# Patient Record
Sex: FEMALE | Race: WHITE | NOT HISPANIC OR LATINO | URBAN - METROPOLITAN AREA
[De-identification: names, ages, dates, MRNs, and addresses within clinical notes are randomized per-mention and may not be internally consistent; named-entity substitution may affect disease eponyms.]

---

## 2018-11-16 PROCEDURE — 87624 HPV HI-RISK TYP POOLED RSLT: CPT | Performed by: OBSTETRICS & GYNECOLOGY

## 2018-11-16 PROCEDURE — 88175 CYTOPATH C/V AUTO FLUID REDO: CPT | Performed by: OBSTETRICS & GYNECOLOGY

## 2021-01-12 ENCOUNTER — IMMUNIZATION (OUTPATIENT)
Dept: LAB | Age: 36
End: 2021-01-12

## 2021-01-12 DIAGNOSIS — Z23 NEED FOR VACCINATION: Primary | ICD-10-CM

## 2021-01-12 PROCEDURE — 91301 COVID-19 MODERNA VACCINE: CPT

## 2021-01-12 PROCEDURE — 0011A COVID-19 MODERNA VACCINE: CPT

## 2021-02-12 ENCOUNTER — IMMUNIZATION (OUTPATIENT)
Dept: LAB | Age: 36
End: 2021-02-12
Attending: HOSPITALIST

## 2021-02-12 DIAGNOSIS — Z23 NEED FOR VACCINATION: Primary | ICD-10-CM

## 2021-02-12 PROCEDURE — 0012A COVID-19 MODERNA VACCINE: CPT | Performed by: HOSPITALIST

## 2021-02-12 PROCEDURE — 91301 COVID-19 MODERNA VACCINE: CPT | Performed by: HOSPITALIST

## 2023-05-26 ENCOUNTER — OFFICE VISIT (OUTPATIENT)
Dept: FAMILY MEDICINE CLINIC | Facility: CLINIC | Age: 38
End: 2023-05-26
Payer: COMMERCIAL

## 2023-05-26 VITALS
HEIGHT: 62 IN | RESPIRATION RATE: 16 BRPM | DIASTOLIC BLOOD PRESSURE: 60 MMHG | HEART RATE: 88 BPM | SYSTOLIC BLOOD PRESSURE: 102 MMHG | OXYGEN SATURATION: 98 % | WEIGHT: 129 LBS | BODY MASS INDEX: 23.74 KG/M2 | TEMPERATURE: 99 F

## 2023-05-26 DIAGNOSIS — R39.9 UTI SYMPTOMS: Primary | ICD-10-CM

## 2023-05-26 LAB
APPEARANCE: NORMAL
BILIRUBIN: NEGATIVE
GLUCOSE (URINE DIPSTICK): NEGATIVE MG/DL
KETONES (URINE DIPSTICK): NEGATIVE MG/DL
LEUKOCYTES: NEGATIVE
MULTISTIX LOT#: NORMAL NUMERIC
NITRITE, URINE: NEGATIVE
OCCULT BLOOD: NEGATIVE
PH, URINE: 6.5 (ref 4.5–8)
PROTEIN (URINE DIPSTICK): NEGATIVE MG/DL
SPECIFIC GRAVITY: 1.01 (ref 1–1.03)
URINE-COLOR: YELLOW
UROBILINOGEN,SEMI-QN: 0.2 MG/DL (ref 0–1.9)

## 2023-05-26 PROCEDURE — 87086 URINE CULTURE/COLONY COUNT: CPT | Performed by: PHYSICIAN ASSISTANT

## 2023-05-26 PROCEDURE — 81003 URINALYSIS AUTO W/O SCOPE: CPT | Performed by: PHYSICIAN ASSISTANT

## 2023-05-26 PROCEDURE — 99213 OFFICE O/P EST LOW 20 MIN: CPT | Performed by: PHYSICIAN ASSISTANT

## 2023-05-26 NOTE — PATIENT INSTRUCTIONS
Urine culture sent  Increase fluids  Follow up with PCP   Azo  Further treatment pending results  If worsening symptoms seek treatment

## 2023-05-30 ENCOUNTER — OFFICE VISIT (OUTPATIENT)
Dept: INTERNAL MEDICINE CLINIC | Facility: CLINIC | Age: 38
End: 2023-05-30
Payer: COMMERCIAL

## 2023-05-30 VITALS
HEART RATE: 84 BPM | SYSTOLIC BLOOD PRESSURE: 110 MMHG | OXYGEN SATURATION: 98 % | TEMPERATURE: 97 F | HEIGHT: 63 IN | WEIGHT: 129.63 LBS | RESPIRATION RATE: 16 BRPM | BODY MASS INDEX: 22.97 KG/M2 | DIASTOLIC BLOOD PRESSURE: 76 MMHG

## 2023-05-30 DIAGNOSIS — Z13.0 SCREENING FOR BLOOD DISEASE: ICD-10-CM

## 2023-05-30 DIAGNOSIS — Z13.220 SCREENING, LIPID: ICD-10-CM

## 2023-05-30 DIAGNOSIS — N94.2 VAGINISMUS: ICD-10-CM

## 2023-05-30 DIAGNOSIS — Z13.29 SCREENING FOR THYROID DISORDER: ICD-10-CM

## 2023-05-30 DIAGNOSIS — Z13.228 SCREENING FOR METABOLIC DISORDER: ICD-10-CM

## 2023-05-30 DIAGNOSIS — R39.15 URINARY URGENCY: Primary | ICD-10-CM

## 2023-05-30 PROBLEM — Z80.0 FAMILY HISTORY OF COLON CANCER: Status: ACTIVE | Noted: 2023-05-30

## 2023-05-30 LAB
BILIRUB UR QL STRIP.AUTO: NEGATIVE
COLOR UR AUTO: YELLOW
GLUCOSE UR STRIP.AUTO-MCNC: NEGATIVE MG/DL
KETONES UR STRIP.AUTO-MCNC: NEGATIVE MG/DL
LEUKOCYTE ESTERASE UR QL STRIP.AUTO: NEGATIVE
NITRITE UR QL STRIP.AUTO: NEGATIVE
PH UR STRIP.AUTO: 9 [PH] (ref 5–8)
PROT UR STRIP.AUTO-MCNC: NEGATIVE MG/DL
RBC UR QL AUTO: NEGATIVE
SP GR UR STRIP.AUTO: 1.02 (ref 1–1.03)
UROBILINOGEN UR STRIP.AUTO-MCNC: <2 MG/DL

## 2023-05-30 PROCEDURE — 3008F BODY MASS INDEX DOCD: CPT | Performed by: INTERNAL MEDICINE

## 2023-05-30 PROCEDURE — 3074F SYST BP LT 130 MM HG: CPT | Performed by: INTERNAL MEDICINE

## 2023-05-30 PROCEDURE — 99203 OFFICE O/P NEW LOW 30 MIN: CPT | Performed by: INTERNAL MEDICINE

## 2023-05-30 PROCEDURE — 3078F DIAST BP <80 MM HG: CPT | Performed by: INTERNAL MEDICINE

## 2023-05-30 PROCEDURE — 81001 URINALYSIS AUTO W/SCOPE: CPT | Performed by: INTERNAL MEDICINE

## 2023-06-07 ENCOUNTER — LABORATORY ENCOUNTER (OUTPATIENT)
Dept: LAB | Age: 38
End: 2023-06-07
Attending: INTERNAL MEDICINE
Payer: COMMERCIAL

## 2023-06-07 DIAGNOSIS — Z13.29 SCREENING FOR THYROID DISORDER: ICD-10-CM

## 2023-06-07 DIAGNOSIS — D75.89 MACROCYTOSIS: ICD-10-CM

## 2023-06-07 DIAGNOSIS — Z13.228 SCREENING FOR METABOLIC DISORDER: ICD-10-CM

## 2023-06-07 DIAGNOSIS — Z13.0 SCREENING FOR BLOOD DISEASE: ICD-10-CM

## 2023-06-07 DIAGNOSIS — D75.89 MACROCYTOSIS: Primary | ICD-10-CM

## 2023-06-07 DIAGNOSIS — Z13.220 SCREENING, LIPID: ICD-10-CM

## 2023-06-07 LAB
ALBUMIN SERPL-MCNC: 3.7 G/DL (ref 3.4–5)
ALBUMIN/GLOB SERPL: 1.1 {RATIO} (ref 1–2)
ALP LIVER SERPL-CCNC: 45 U/L
ALT SERPL-CCNC: 17 U/L
ANION GAP SERPL CALC-SCNC: 6 MMOL/L (ref 0–18)
AST SERPL-CCNC: 15 U/L (ref 15–37)
BASOPHILS # BLD AUTO: 0.03 X10(3) UL (ref 0–0.2)
BASOPHILS NFR BLD AUTO: 0.8 %
BILIRUB SERPL-MCNC: 0.8 MG/DL (ref 0.1–2)
BUN BLD-MCNC: 11 MG/DL (ref 7–18)
CALCIUM BLD-MCNC: 9.2 MG/DL (ref 8.5–10.1)
CHLORIDE SERPL-SCNC: 107 MMOL/L (ref 98–112)
CHOLEST SERPL-MCNC: 165 MG/DL (ref ?–200)
CO2 SERPL-SCNC: 25 MMOL/L (ref 21–32)
CREAT BLD-MCNC: 0.74 MG/DL
EOSINOPHIL # BLD AUTO: 0.11 X10(3) UL (ref 0–0.7)
EOSINOPHIL NFR BLD AUTO: 2.9 %
ERYTHROCYTE [DISTWIDTH] IN BLOOD BY AUTOMATED COUNT: 11.5 %
FASTING PATIENT LIPID ANSWER: YES
FASTING STATUS PATIENT QL REPORTED: YES
GFR SERPLBLD BASED ON 1.73 SQ M-ARVRAT: 107 ML/MIN/1.73M2 (ref 60–?)
GLOBULIN PLAS-MCNC: 3.4 G/DL (ref 2.8–4.4)
GLUCOSE BLD-MCNC: 95 MG/DL (ref 70–99)
HCT VFR BLD AUTO: 39.3 %
HDLC SERPL-MCNC: 54 MG/DL (ref 40–59)
HGB BLD-MCNC: 13.3 G/DL
IMM GRANULOCYTES # BLD AUTO: 0.01 X10(3) UL (ref 0–1)
IMM GRANULOCYTES NFR BLD: 0.3 %
LDLC SERPL CALC-MCNC: 94 MG/DL (ref ?–100)
LYMPHOCYTES # BLD AUTO: 1.27 X10(3) UL (ref 1–4)
LYMPHOCYTES NFR BLD AUTO: 33.4 %
MCH RBC QN AUTO: 34.1 PG (ref 26–34)
MCHC RBC AUTO-ENTMCNC: 33.8 G/DL (ref 31–37)
MCV RBC AUTO: 100.8 FL
MONOCYTES # BLD AUTO: 0.35 X10(3) UL (ref 0.1–1)
MONOCYTES NFR BLD AUTO: 9.2 %
NEUTROPHILS # BLD AUTO: 2.03 X10 (3) UL (ref 1.5–7.7)
NEUTROPHILS # BLD AUTO: 2.03 X10(3) UL (ref 1.5–7.7)
NEUTROPHILS NFR BLD AUTO: 53.4 %
NONHDLC SERPL-MCNC: 111 MG/DL (ref ?–130)
OSMOLALITY SERPL CALC.SUM OF ELEC: 285 MOSM/KG (ref 275–295)
PLATELET # BLD AUTO: 233 10(3)UL (ref 150–450)
POTASSIUM SERPL-SCNC: 4.6 MMOL/L (ref 3.5–5.1)
PROT SERPL-MCNC: 7.1 G/DL (ref 6.4–8.2)
RBC # BLD AUTO: 3.9 X10(6)UL
SODIUM SERPL-SCNC: 138 MMOL/L (ref 136–145)
TRIGL SERPL-MCNC: 94 MG/DL (ref 30–149)
TSI SER-ACNC: 1.41 MIU/ML (ref 0.36–3.74)
VIT B12 SERPL-MCNC: 312 PG/ML (ref 193–986)
VLDLC SERPL CALC-MCNC: 15 MG/DL (ref 0–30)
WBC # BLD AUTO: 3.8 X10(3) UL (ref 4–11)

## 2023-06-07 PROCEDURE — 82607 VITAMIN B-12: CPT | Performed by: INTERNAL MEDICINE

## 2023-06-07 PROCEDURE — 80050 GENERAL HEALTH PANEL: CPT | Performed by: INTERNAL MEDICINE

## 2023-06-07 PROCEDURE — 80061 LIPID PANEL: CPT | Performed by: INTERNAL MEDICINE

## 2023-06-08 DIAGNOSIS — D75.89 MACROCYTOSIS: Primary | ICD-10-CM

## 2023-06-09 DIAGNOSIS — D72.819 LEUKOPENIA, UNSPECIFIED TYPE: Primary | ICD-10-CM

## 2023-06-09 DIAGNOSIS — D75.89 MACROCYTOSIS: ICD-10-CM

## 2023-07-07 ENCOUNTER — LAB ENCOUNTER (OUTPATIENT)
Dept: LAB | Age: 38
End: 2023-07-07
Attending: INTERNAL MEDICINE
Payer: COMMERCIAL

## 2023-07-07 DIAGNOSIS — D75.89 MACROCYTOSIS: ICD-10-CM

## 2023-07-07 DIAGNOSIS — D75.89 MACROCYTOSIS: Primary | ICD-10-CM

## 2023-07-07 DIAGNOSIS — D72.819 LEUKOPENIA, UNSPECIFIED TYPE: ICD-10-CM

## 2023-07-07 LAB
ERYTHROCYTE [DISTWIDTH] IN BLOOD BY AUTOMATED COUNT: 11.5 %
FOLATE SERPL-MCNC: 19.9 NG/ML (ref 8.7–?)
HCT VFR BLD AUTO: 41.1 %
HGB BLD-MCNC: 13.6 G/DL
MCH RBC QN AUTO: 34.4 PG (ref 26–34)
MCHC RBC AUTO-ENTMCNC: 33.1 G/DL (ref 31–37)
MCV RBC AUTO: 104.1 FL
PLATELET # BLD AUTO: 236 10(3)UL (ref 150–450)
RBC # BLD AUTO: 3.95 X10(6)UL
WBC # BLD AUTO: 5.1 X10(3) UL (ref 4–11)

## 2023-07-07 PROCEDURE — 85027 COMPLETE CBC AUTOMATED: CPT

## 2023-07-07 PROCEDURE — 82746 ASSAY OF FOLIC ACID SERUM: CPT

## 2023-08-11 ENCOUNTER — LAB ENCOUNTER (OUTPATIENT)
Dept: LAB | Age: 38
End: 2023-08-11
Attending: INTERNAL MEDICINE
Payer: COMMERCIAL

## 2023-08-11 DIAGNOSIS — D75.89 MACROCYTOSIS: ICD-10-CM

## 2023-08-11 PROCEDURE — 84630 ASSAY OF ZINC: CPT

## 2023-08-11 PROCEDURE — 82525 ASSAY OF COPPER: CPT

## 2023-08-18 LAB
COPPER: 99 UG/DL
ZINC: 80 UG/DL

## 2023-10-13 ENCOUNTER — LAB ENCOUNTER (OUTPATIENT)
Dept: LAB | Age: 38
End: 2023-10-13
Attending: INTERNAL MEDICINE
Payer: COMMERCIAL

## 2023-10-13 DIAGNOSIS — R71.8 ELEVATED MCV: ICD-10-CM

## 2023-10-13 DIAGNOSIS — R39.15 URINARY URGENCY: ICD-10-CM

## 2023-10-13 DIAGNOSIS — D75.89 MACROCYTOSIS: Primary | ICD-10-CM

## 2023-10-13 LAB
BASOPHILS # BLD AUTO: 0.04 X10(3) UL (ref 0–0.2)
BASOPHILS NFR BLD AUTO: 0.9 %
EOSINOPHIL # BLD AUTO: 0.11 X10(3) UL (ref 0–0.7)
EOSINOPHIL NFR BLD AUTO: 2.6 %
ERYTHROCYTE [DISTWIDTH] IN BLOOD BY AUTOMATED COUNT: 11 %
HCT VFR BLD AUTO: 41.3 %
HGB BLD-MCNC: 13.8 G/DL
IMM GRANULOCYTES # BLD AUTO: 0.01 X10(3) UL (ref 0–1)
IMM GRANULOCYTES NFR BLD: 0.2 %
LYMPHOCYTES # BLD AUTO: 1.47 X10(3) UL (ref 1–4)
LYMPHOCYTES NFR BLD AUTO: 34.3 %
MCH RBC QN AUTO: 34.4 PG (ref 26–34)
MCHC RBC AUTO-ENTMCNC: 33.4 G/DL (ref 31–37)
MCV RBC AUTO: 103 FL
MONOCYTES # BLD AUTO: 0.38 X10(3) UL (ref 0.1–1)
MONOCYTES NFR BLD AUTO: 8.9 %
NEUTROPHILS # BLD AUTO: 2.28 X10 (3) UL (ref 1.5–7.7)
NEUTROPHILS # BLD AUTO: 2.28 X10(3) UL (ref 1.5–7.7)
NEUTROPHILS NFR BLD AUTO: 53.1 %
PLATELET # BLD AUTO: 228 10(3)UL (ref 150–450)
RBC # BLD AUTO: 4.01 X10(6)UL
WBC # BLD AUTO: 4.3 X10(3) UL (ref 4–11)

## 2023-10-13 PROCEDURE — 85025 COMPLETE CBC W/AUTO DIFF WBC: CPT

## 2023-10-31 ENCOUNTER — OFFICE VISIT (OUTPATIENT)
Dept: HEMATOLOGY/ONCOLOGY | Facility: HOSPITAL | Age: 38
End: 2023-10-31
Attending: INTERNAL MEDICINE

## 2023-10-31 VITALS
HEART RATE: 93 BPM | TEMPERATURE: 97 F | SYSTOLIC BLOOD PRESSURE: 118 MMHG | DIASTOLIC BLOOD PRESSURE: 69 MMHG | OXYGEN SATURATION: 99 % | WEIGHT: 135.19 LBS | RESPIRATION RATE: 16 BRPM | BODY MASS INDEX: 24 KG/M2

## 2023-10-31 DIAGNOSIS — D75.89 MACROCYTOSIS: Primary | ICD-10-CM

## 2023-10-31 DIAGNOSIS — Z80.0 FAMILY HISTORY OF COLON CANCER: ICD-10-CM

## 2023-10-31 PROCEDURE — 99204 OFFICE O/P NEW MOD 45 MIN: CPT | Performed by: INTERNAL MEDICINE

## 2023-10-31 NOTE — PROGRESS NOTES
Here for new consult- macrocytosis. She reports weight gain and abdominal bloating since march. She reports more weight gain today. She has had abnormal bowel habits where she will have a bowel movement Friday, sat, and sun, and then not go for another week. She is currently doing pelvic floor therapy with PT. The abdominal bloating is a little bit less than it was. She reports chronic joint pains.

## 2023-11-15 ENCOUNTER — TELEPHONE (OUTPATIENT)
Dept: INTERNAL MEDICINE CLINIC | Facility: CLINIC | Age: 38
End: 2023-11-15

## 2023-11-15 NOTE — TELEPHONE ENCOUNTER
Informed must fast no call back required. Orders to [facility]  Future Appointments   Date Time Provider Department Center   12/6/2023  2:00 PM Estevan Weinstein,  [facility]

## 2023-11-24 ENCOUNTER — OFFICE VISIT (OUTPATIENT)
Dept: FAMILY MEDICINE CLINIC | Facility: CLINIC | Age: 38
End: 2023-11-24
Payer: COMMERCIAL

## 2023-11-24 VITALS
RESPIRATION RATE: 18 BRPM | HEIGHT: 62 IN | WEIGHT: 130 LBS | TEMPERATURE: 98 F | OXYGEN SATURATION: 96 % | SYSTOLIC BLOOD PRESSURE: 110 MMHG | HEART RATE: 89 BPM | DIASTOLIC BLOOD PRESSURE: 76 MMHG | BODY MASS INDEX: 23.92 KG/M2

## 2023-11-24 DIAGNOSIS — J01.00 ACUTE NON-RECURRENT MAXILLARY SINUSITIS: Primary | ICD-10-CM

## 2023-11-24 PROCEDURE — 3074F SYST BP LT 130 MM HG: CPT

## 2023-11-24 PROCEDURE — 3008F BODY MASS INDEX DOCD: CPT

## 2023-11-24 PROCEDURE — 3078F DIAST BP <80 MM HG: CPT

## 2023-11-24 PROCEDURE — 99213 OFFICE O/P EST LOW 20 MIN: CPT

## 2023-11-24 RX ORDER — DOXYCYCLINE HYCLATE 100 MG/1
100 CAPSULE ORAL 2 TIMES DAILY
Qty: 14 CAPSULE | Refills: 0 | Status: SHIPPED | OUTPATIENT
Start: 2023-11-24 | End: 2023-12-01

## 2023-12-06 ENCOUNTER — OFFICE VISIT (OUTPATIENT)
Dept: INTERNAL MEDICINE CLINIC | Facility: CLINIC | Age: 38
End: 2023-12-06
Payer: COMMERCIAL

## 2023-12-06 VITALS
HEART RATE: 74 BPM | HEIGHT: 62 IN | BODY MASS INDEX: 24.63 KG/M2 | OXYGEN SATURATION: 98 % | WEIGHT: 133.81 LBS | SYSTOLIC BLOOD PRESSURE: 118 MMHG | DIASTOLIC BLOOD PRESSURE: 64 MMHG | RESPIRATION RATE: 18 BRPM

## 2023-12-06 DIAGNOSIS — N93.9 VAGINAL SPOTTING: ICD-10-CM

## 2023-12-06 DIAGNOSIS — Z97.5 IUD (INTRAUTERINE DEVICE) IN PLACE: ICD-10-CM

## 2023-12-06 DIAGNOSIS — R53.83 FATIGUE, UNSPECIFIED TYPE: Primary | ICD-10-CM

## 2023-12-06 DIAGNOSIS — N94.2 VAGINISMUS: ICD-10-CM

## 2023-12-06 DIAGNOSIS — R14.0 ABDOMINAL BLOATING: ICD-10-CM

## 2023-12-06 DIAGNOSIS — Z23 NEEDS FLU SHOT: ICD-10-CM

## 2023-12-06 PROCEDURE — 3008F BODY MASS INDEX DOCD: CPT | Performed by: INTERNAL MEDICINE

## 2023-12-06 PROCEDURE — 3078F DIAST BP <80 MM HG: CPT | Performed by: INTERNAL MEDICINE

## 2023-12-06 PROCEDURE — 3074F SYST BP LT 130 MM HG: CPT | Performed by: INTERNAL MEDICINE

## 2023-12-06 PROCEDURE — 90686 IIV4 VACC NO PRSV 0.5 ML IM: CPT | Performed by: INTERNAL MEDICINE

## 2023-12-06 PROCEDURE — 90471 IMMUNIZATION ADMIN: CPT | Performed by: INTERNAL MEDICINE

## 2023-12-06 PROCEDURE — 99214 OFFICE O/P EST MOD 30 MIN: CPT | Performed by: INTERNAL MEDICINE

## 2023-12-06 RX ORDER — DICYCLOMINE HCL 20 MG
20 TABLET ORAL
Qty: 120 TABLET | Refills: 0 | Status: SHIPPED | OUTPATIENT
Start: 2023-12-06 | End: 2024-01-05

## 2024-01-26 ENCOUNTER — LAB ENCOUNTER (OUTPATIENT)
Dept: LAB | Age: 39
End: 2024-01-26
Attending: INTERNAL MEDICINE
Payer: COMMERCIAL

## 2024-01-26 DIAGNOSIS — E55.9 VITAMIN D DEFICIENCY: Primary | ICD-10-CM

## 2024-01-26 DIAGNOSIS — N93.9 VAGINAL SPOTTING: ICD-10-CM

## 2024-01-26 DIAGNOSIS — R53.83 FATIGUE, UNSPECIFIED TYPE: ICD-10-CM

## 2024-01-26 LAB
FSH SERPL-ACNC: 5.4 MIU/ML
LH SERPL-ACNC: 7 MIU/ML
PROLACTIN SERPL-MCNC: 14.8 NG/ML
TSI SER-ACNC: 1.11 MIU/ML (ref 0.36–3.74)
VIT D+METAB SERPL-MCNC: 14.7 NG/ML (ref 30–100)

## 2024-01-26 PROCEDURE — 82671 ASSAY OF ESTROGENS: CPT

## 2024-01-26 PROCEDURE — 84146 ASSAY OF PROLACTIN: CPT

## 2024-01-26 PROCEDURE — 83002 ASSAY OF GONADOTROPIN (LH): CPT

## 2024-01-26 PROCEDURE — 84443 ASSAY THYROID STIM HORMONE: CPT

## 2024-01-26 PROCEDURE — 82306 VITAMIN D 25 HYDROXY: CPT

## 2024-01-26 PROCEDURE — 83001 ASSAY OF GONADOTROPIN (FSH): CPT

## 2024-01-26 RX ORDER — ERGOCALCIFEROL 1.25 MG/1
50000 CAPSULE ORAL WEEKLY
Qty: 12 CAPSULE | Refills: 0 | Status: SHIPPED | OUTPATIENT
Start: 2024-01-26 | End: 2024-04-13

## 2024-01-30 LAB
ESTRADIOL: 63.3 PG/ML
ESTRONE: 88 PG/ML

## 2024-04-02 ENCOUNTER — TELEPHONE (OUTPATIENT)
Dept: INTERNAL MEDICINE CLINIC | Facility: CLINIC | Age: 39
End: 2024-04-02

## 2024-04-02 ENCOUNTER — OFFICE VISIT (OUTPATIENT)
Dept: INTERNAL MEDICINE CLINIC | Facility: CLINIC | Age: 39
End: 2024-04-02
Payer: COMMERCIAL

## 2024-04-02 VITALS
WEIGHT: 133 LBS | RESPIRATION RATE: 12 BRPM | HEIGHT: 62 IN | OXYGEN SATURATION: 98 % | BODY MASS INDEX: 24.48 KG/M2 | DIASTOLIC BLOOD PRESSURE: 62 MMHG | HEART RATE: 67 BPM | SYSTOLIC BLOOD PRESSURE: 102 MMHG

## 2024-04-02 DIAGNOSIS — R58 ECCHYMOSIS: ICD-10-CM

## 2024-04-02 DIAGNOSIS — M62.81 MUSCLE WEAKNESS: ICD-10-CM

## 2024-04-02 DIAGNOSIS — G58.8 OTHER MONONEUROPATHY: ICD-10-CM

## 2024-04-02 DIAGNOSIS — M25.50 ARTHRALGIA, UNSPECIFIED JOINT: Primary | ICD-10-CM

## 2024-04-02 PROCEDURE — 99214 OFFICE O/P EST MOD 30 MIN: CPT | Performed by: INTERNAL MEDICINE

## 2024-04-02 PROCEDURE — 3008F BODY MASS INDEX DOCD: CPT | Performed by: INTERNAL MEDICINE

## 2024-04-02 PROCEDURE — 3078F DIAST BP <80 MM HG: CPT | Performed by: INTERNAL MEDICINE

## 2024-04-02 PROCEDURE — 3074F SYST BP LT 130 MM HG: CPT | Performed by: INTERNAL MEDICINE

## 2024-04-02 NOTE — PROGRESS NOTES
Kaelyn Ayon  1/1/1985    Chief Complaint   Patient presents with    ER F/U     Room # 15 KB     SUBJECTIVE   Kaelyn Ayon is a 39 year old female who presents for ED follow up.    She was seen at [facility] ED on 3/24 for ecchymosis and numbness in left 4th digit. She was diagnosed w/ vasculitis. Achenbach Syndrome was also considered. CBC, PT/INR unremarkable save for known macrocytosis.    This has never happened to the patient before and has no recurred.    She has always had cold hands, no discrete color change when exposed to cold.    Since 31 she developed pain and weakness particularly in upper extremities. 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    Described as her body feeling heavier than normal and taking more effort to move extremities. Since seeing chiropractor regularly these symptoms have improved.    Review of Systems   Review of Systems   No f/c/chest pain or sob. No cough. No ha or dizziness. No numbness, tingling, or weakness.     OBJECTIVE:   /62   Pulse 67   Resp 12   Ht 5' 2\" (1.575 m)   Wt 133 lb (60.3 kg)   LMP  (LMP Unknown)   SpO2 98%   BMI 24.33 kg/m²   Physical Exam   Constitutional: Oriented to person, place, and time. No distress.   Pulmonary/Chest: Effort normal. No respiratory distress.  Musculoskeletal: No synovitis in joints of hands. Strength 5/5 in BUE. Full ROM in bilateral knees.  Neurological: No focal neurological deficits. Reflexes are brisk.  Skin: Skin is warm and dry. No rash on visualized portions of skin.  Psychiatric: Normal mood and affect.     Lab Results   Component Value Date    GLU 95 06/07/2023    BUN 11 06/07/2023    CREATSERUM 0.74 06/07/2023    ANIONGAP 6 06/07/2023    CA 9.2 06/07/2023     06/07/2023    K 4.6 06/07/2023     06/07/2023    CO2 25.0 06/07/2023    OSMOCALC 285 06/07/2023      Lab Results   Component Value Date    WBC 4.3 10/13/2023    RBC 4.01 10/13/2023    HGB 13.8 10/13/2023    HCT 41.3  10/13/2023    .0 (H) 10/13/2023    MCH 34.4 (H) 10/13/2023    MCHC 33.4 10/13/2023    RDW 11.0 10/13/2023    .0 10/13/2023      Lab Results   Component Value Date    TSH 1.110 01/26/2024        ASSESSMENT AND PLAN:       ICD-10-CM    1. Arthralgia, unspecified joint  M25.50 Rheumatoid Arthritis Factor     Cyclic Citrullinate Peptide (CCP) antibodies     Sed Rate, Westergren (Automated)     C-Reactive Protein     Katy, Direct, Reflex To 9 Enas ([facility])     CELIAC DISEASE SCREEN Reflex [E]     Connective Tissue Disease (KATY) Screen [E]      2. Muscle weakness  M62.81 Sed Rate, Westergren (Automated)     C-Reactive Protein     CK (Creatine Kinase) (Not Creatinine) (E)     Connective Tissue Disease (KATY) Screen [E]      3. Other mononeuropathy  G58.8 Rheumatoid Arthritis Factor     Cyclic Citrullinate Peptide (CCP) antibodies     Sed Rate, Westergren (Automated)     C-Reactive Protein     Katy, Direct, Reflex To 9 Enas ([facility])     Connective Tissue Disease (KATY) Screen [E]     ANCA Panel Vasculitis      4. Ecchymosis  R58 Of left 4th digit. Non-traumatic. Achenbach's Syndrome is definitely possible versus other vascular phenomena. Checking labs as above.      Patient has had chronic weakness and joint pain that started at age 31. Will check extensive labs as above. Poss history of Raynaud.       The patient indicates understanding of these issues and agrees to the plan.  The patient is asked to return or present to the emergency room for worsening of symptoms.    TODAY'S ORDERS     No orders of the defined types were placed in this encounter.      Meds & Refills:  Requested Prescriptions      No prescriptions requested or ordered in this encounter       Imaging & Consults:  None    No follow-ups on file.  There are no Patient Instructions on file for this visit.    All questions were answered and the patient agrees with the plan.     Thank you,  Estevan Weinstein, DO  A total of 35 minutes  was spent for this encounter.

## 2024-04-02 NOTE — TELEPHONE ENCOUNTER
Records Requested from:    [facility] (ER visit)     Fax:[phone]    Confirmation was received.    Sent to scanning.

## 2024-04-05 ENCOUNTER — LAB ENCOUNTER (OUTPATIENT)
Dept: LAB | Age: 39
End: 2024-04-05
Attending: INTERNAL MEDICINE
Payer: COMMERCIAL

## 2024-04-05 DIAGNOSIS — M62.81 MUSCLE WEAKNESS: ICD-10-CM

## 2024-04-05 DIAGNOSIS — G58.8 OTHER MONONEUROPATHY: ICD-10-CM

## 2024-04-05 DIAGNOSIS — M25.50 ARTHRALGIA, UNSPECIFIED JOINT: ICD-10-CM

## 2024-04-05 LAB
CK SERPL-CCNC: 63 U/L
CRP SERPL-MCNC: <0.29 MG/DL (ref ?–0.3)
ERYTHROCYTE [SEDIMENTATION RATE] IN BLOOD: 14 MM/HR
IGA SERPL-MCNC: 145.5 MG/DL (ref 70–312)
RHEUMATOID FACT SERPL-ACNC: <10 IU/ML (ref ?–15)

## 2024-04-05 PROCEDURE — 86200 CCP ANTIBODY: CPT

## 2024-04-05 PROCEDURE — 86037 ANCA TITER EACH ANTIBODY: CPT

## 2024-04-05 PROCEDURE — 86364 TISS TRNSGLTMNASE EA IG CLAS: CPT

## 2024-04-05 PROCEDURE — 82550 ASSAY OF CK (CPK): CPT

## 2024-04-05 PROCEDURE — 86431 RHEUMATOID FACTOR QUANT: CPT

## 2024-04-05 PROCEDURE — 86038 ANTINUCLEAR ANTIBODIES: CPT

## 2024-04-05 PROCEDURE — 83516 IMMUNOASSAY NONANTIBODY: CPT

## 2024-04-05 PROCEDURE — 86225 DNA ANTIBODY NATIVE: CPT

## 2024-04-05 PROCEDURE — 82784 ASSAY IGA/IGD/IGG/IGM EACH: CPT

## 2024-04-05 PROCEDURE — 86140 C-REACTIVE PROTEIN: CPT

## 2024-04-05 PROCEDURE — 85652 RBC SED RATE AUTOMATED: CPT

## 2024-04-08 LAB
CCP IGG SERPL-ACNC: 1.4 U/ML (ref 0–6.9)
DSDNA IGG SERPL IA-ACNC: 1.5 IU/ML
ENA AB SER QL IA: 0.2 UG/L
ENA AB SER QL IA: NEGATIVE
TTG IGA SER-ACNC: 0.5 U/ML (ref ?–7)

## 2024-04-09 LAB
ANTI-MPO ANTIBODIES: <0.2 UNITS
ANTI-PR3 ANTIBODIES: <0.2 UNITS

## 2024-04-11 DIAGNOSIS — R58 ECCHYMOSIS: ICD-10-CM

## 2024-04-11 DIAGNOSIS — M25.50 ARTHRALGIA, UNSPECIFIED JOINT: Primary | ICD-10-CM

## 2024-07-09 ENCOUNTER — PATIENT MESSAGE (OUTPATIENT)
Dept: INTERNAL MEDICINE CLINIC | Facility: CLINIC | Age: 39
End: 2024-07-09

## 2024-07-09 DIAGNOSIS — K57.92 DIVERTICULITIS: Primary | ICD-10-CM

## 2024-07-09 DIAGNOSIS — N20.0 KIDNEY STONE: ICD-10-CM

## 2024-07-12 NOTE — TELEPHONE ENCOUNTER
From: Kaelyn Ayon  To: Estevan Weinstein  Sent: 7/9/2024 12:35 PM CDT  Subject: Referral Requests    Hi Dr. Weinstein,   I was seen at a [facility] yesterday, and I was diagnosed with Kidney Stones and Diverticulosis. It's been recommended I see a Urologist and Gastroenterologist for follow-up. They automatically sent me referrals for their providers. However, I wanted to see if I could possibly schedule with [facility] providers. Are you able to send me referrals for both Urology and Gastroenterology? Thanks!    -Kaelyn

## 2024-07-12 NOTE — TELEPHONE ENCOUNTER
Please advise on provider recommendations PPO insurance so referrals/orders are recommendations for care.

## 2024-07-12 NOTE — TELEPHONE ENCOUNTER
Referred to Dinora Stokes for Urology. Can see any provider at [facility]. As long as she had diverticulosis not diverticulitis and is asymptomatic does not necessary have to see GI urgently.

## 2024-08-19 ENCOUNTER — OFFICE VISIT (OUTPATIENT)
Dept: RHEUMATOLOGY | Facility: CLINIC | Age: 39
End: 2024-08-19
Payer: COMMERCIAL

## 2024-08-19 VITALS
HEART RATE: 86 BPM | BODY MASS INDEX: 24.48 KG/M2 | WEIGHT: 133 LBS | HEIGHT: 62 IN | DIASTOLIC BLOOD PRESSURE: 72 MMHG | OXYGEN SATURATION: 98 % | TEMPERATURE: 98 F | SYSTOLIC BLOOD PRESSURE: 116 MMHG

## 2024-08-19 DIAGNOSIS — M79.7 FIBROMYALGIA: Primary | ICD-10-CM

## 2024-08-19 DIAGNOSIS — F51.01 PRIMARY INSOMNIA: ICD-10-CM

## 2024-08-19 DIAGNOSIS — F41.9 ANXIETY: ICD-10-CM

## 2024-08-19 PROCEDURE — 99205 OFFICE O/P NEW HI 60 MIN: CPT | Performed by: INTERNAL MEDICINE

## 2024-08-19 PROCEDURE — 3008F BODY MASS INDEX DOCD: CPT | Performed by: INTERNAL MEDICINE

## 2024-08-19 PROCEDURE — 3078F DIAST BP <80 MM HG: CPT | Performed by: INTERNAL MEDICINE

## 2024-08-19 PROCEDURE — 3074F SYST BP LT 130 MM HG: CPT | Performed by: INTERNAL MEDICINE

## 2024-08-19 RX ORDER — DICYCLOMINE HCL 20 MG
20 TABLET ORAL
COMMUNITY

## 2024-08-19 NOTE — PROGRESS NOTES
[facility], [address] [place] [address]      Consult     Kaelyn Ayon Patient Status:  No patient class for patient encounter    1985 MRN [id number]   Location [facility], [address] [place] [address] Attending No att. providers found   Hosp Day # 0 PCP Estevan Weinstein DO     Referring Provider: PCP    Reason for Consultation: Chronic pain syndrome; rule out connective tissue disease      Subjective:    Kaelyn Ayon is a 39 year old female with comes in for further evaluation of joint pain 8 years she has had chronically since he originally had her (IUD) copper one (10 years) she changed to a new one 3 years ago    Has pain in her shoulders, elbows , wrists or hands; left knee pain; low back; hip pain (COVID)  (mild to mod) LOC (left knee)     Sees a chiropractor for last 2-3 years (manipulation) stretching (benefits once a month, back to 2 weeks) 2024 (kidney stones) first time diagnosis (saw a urologist, passed) left kidney (second) appt.     Has also has neck pain (combination of burning; numbness,shooting; achiness)     Denies any history of DVT PE TIA CVA seizures     +migraines +headaches (infrequent) tylneol ibuprofen) 4 weeks ago (5 years ago last one)    States no shortness of breath ,chest pain ,fevers ,chills    States no urinary or bowel symptoms; bloody stools    States no headaches +jaw pain (mouth guard) clenching: , vision changes    States no history of pericardial, pleural effusions    States no significant dry eyes or dry mouth (mild dry)     States no history of uveitis iritis scleritis    States no history of Raynaud's or digital ulcerations (just whitish) 10-20 minutes; tingling    States no major weight changes; night sweats    Wt Readings from Last 6 Encounters:   24 133 lb (60.3 kg)   24 133 lb (60.3 kg)   23 133 lb 12.8 oz (60.7 kg)   23 130 lb (59 kg)   10/31/23 135 lb 3.2 oz (61.3 kg)   23 129 lb  9.6 oz (58.8 kg)     Her weight has been stable    States no history of photosensitive or malar rash. (Beer flushed)     States no history of psoriasis    Denies any depression +anxiety + insomnia (fatigue she has multiple cats has nonrestorative sleep    States childhood of mother having mental health issues as the trauma but no physical trauma; she was doing therapy since she was 23 years old    History/Other:      Past Medical History:  Past Medical History:    Calculus of kidney    Diverticulosis        Past Surgical History:   Past Surgical History:   Procedure Laterality Date    Appendectomy      Colonoscopy      july 2021    Other surgical history      sinus surgery     Other surgical history      oral surgery     Tonsillectomy         Social History:  reports that she has never smoked. She has never used smokeless tobacco. She reports current alcohol use. She reports that she does not use drugs.    Family History:   Family History   Problem Relation Age of Onset    Colon Cancer Father     Heart Disease Mother     Hypertension Mother     Cancer Mother         pre cancer for melanona on vulvar    Thyroid Disorder Mother     Mental Disorder Mother     Dementia Mother         Alzheimers     Dementia Maternal Grandmother         Alzheimers    Mental Disorder Maternal Grandmother     Cancer Maternal Grandfather         Lung       Allergies:   Allergies   Allergen Reactions    Amoxicillin NAUSEA AND VOMITING    Metronidazole HALLUCINATION and ITCHING       Current Medications:  Current Outpatient Medications   Medication Sig Dispense Refill    dicyclomine 20 MG Oral Tab Take 1 tablet (20 mg total) by mouth.      levonorgestrel (KYLEENA) 19.5 MG Intrauterine IUD 19,500 mcg (1 each total) by Intrauterine route one time.      busPIRone HCl 10 MG Oral Tab Take 1 tablet (10 mg total) by mouth in the morning and 1 tablet (10 mg total) before bedtime.      Atomoxetine HCl 60 MG Oral Cap TK 1 C PO EVERY MORNING        No  current facility-administered medications for this visit.     (Not in a hospital admission)    Wt Readings from Last 6 Encounters:   08/19/24 133 lb (60.3 kg)   04/02/24 133 lb (60.3 kg)   12/06/23 133 lb 12.8 oz (60.7 kg)   11/24/23 130 lb (59 kg)   10/31/23 135 lb 3.2 oz (61.3 kg)   05/30/23 129 lb 9.6 oz (58.8 kg)         Review of Systems:     Constitutional: Negative for chills, , fatigue, fever and unexpected weight change.    HENT: Negative for congestion, and mouth sores.    Eyes: Negative for photophobia, pain, redness and visual disturbance.    Respiratory: Negative for apnea, cough, chest tightness, shortness of breath, wheezing and stridor.    Cardiovascular: Negative for chest pain, palpitations and leg swelling.    Gastrointestinal: Negative for abdominal distention, abdominal pain, blood in stool, constipation, diarrhea and nausea.    Endocrine: Negative.     Genitourinary: Negative for decreased urine volume, difficulty urinating, dyspareunia, dysuria, flank pain, and frequency.    Musculoskeletal: Negative for arthralgias, gait problem and joint swelling.    Skin: Negative for color change, pallor and rash. No raynauds or digital ulcerations no sclerodactly.    Allergic/Immunologic: Negative.    Neurological: Negative for dizziness, tremors, seizures, syncope, speech difficulty, weakness, light-headedness, numbness and headaches.    Hematological: Does not bruise/bleed easily.    Psychiatric/Behavioral: Negative for confusion, decreased concentration, hallucinations, self-injury, sleep disturbance and suicidal ideas or depression.    Objective:   Vitals:    08/19/24 1100   BP: 116/72   Pulse: 86   Temp: 98.2 °F (36.8 °C)          Constitutional: is oriented to person, place, and time. Appears well-developed and well-nourished. No distress.    HEENT: Normocephalic; EOMI; no jvd; no LAD; no oral or nasal ulcers.     Eyes: Conjunctivae and EOM are normal. Pupils are equal, round, and reactive to  light.     Neck: Normal range of motion. No thyromegaly present.    Cardiovascular: RRR, no murmurs.    Lungs: Clear, Bilateral air entry, no wheezes.    Abdominal: Soft.    Musculoskeletal:         Joint Exam 08/19/2024        Right  Left   Sternoclavicular   Tender   Tender   Acromioclavicular   Tender   Tender   Cervical Spine   Tender      Thoracic Spine   Tender      Lumbar Spine   Tender      Sacroiliac   Tender   Tender   Hip   Tender   Tender        Swollen: 0      Tender: 11          Right shoulder: Exhibits normal range of motion on abduction and internal rotation, no tenderness, no bony tenderness, no deformity, no laceration, no pain and no spasm.        Left shoulder: Exhibits normal range of motion on abduction and internal and external rotation.  no tenderness, no bony tenderness, no swelling, no effusion, no deformity, no pain, no spasm and normal strength.        Right elbow:  Exhibits normal range of motion, no swelling, no effusion and no deformity. No tenderness found. No medial epicondyle, no lateral epicondyle and no olecranon process tenderness noted. There are no contractures or tophi or nodules.        Left elbow:  Normal range of motion, no swelling, no effusion and no deformity. No medial epicondyle, no lateral epicondyle and no olecranon process tenderness noted. There are no contractures or tophi or nodules.        Right wrist:  Exhibits normal range of motion, no tenderness, no bony tenderness, no swelling, no effusion and no crepitus. Flexion and extension intact w/o limitation.        Left wrist: Exhibits normal range of motion, no tenderness, no bony tenderness, no swelling, no effusion, no crepitus and no deformity. Flexion and extension intact without limitation.        Right hip: Exhibits normal range of motion, normal strength, no tenderness, no bony tenderness, no swelling and no crepitus.        Right hand: No synovitis of MCP,PIP or DIP joints; no Bouchards or Heberden nodules  noted;  strength: 100%.        Left hand: No synovitis of MCP,PIP or DIP joints; no Bouchards or Heberden nodules noted;  strength: 100%.        Left hip: Exhibits normal range of motion, normal strength, no tenderness, no bony tenderness, No swelling and no crepitus.        Right knee: Exhibits normal range of motion, no swelling, no effusion, no ecchymosis, no deformity and no erythema. No tenderness found. No medial joint line, no lateral joint line, no MCL and no LCL tenderness noted. No crepitation on flexion of knee and extension normal.        Left knee:  Exhibits normal range of motion, no swelling, no effusion, no ecchymosis and no erythema. No tenderness found. No medial joint line, no lateral joint line and no patellar tendon tenderness noted. No crepitation on flexion of the knee. Extension intact and normal.        Right ankle: No swelling, no deformity. No tenderness. Dorsiflexion and plantar flexion intact without limitation in range of motion.        Left ankle: Exhibits no swelling. No tenderness. No lateral malleolus and no medial malleolus tenderness found. Achilles tendon normal. Achilles tendon exhibits no pain, no defect and normal Weaver's test results.  Dorsiflexion and plantar flexion intact without limitation in range of motion.        Cervical back: Exhibits normal range of motion, no tenderness, no bony tenderness, no swelling, no pain and + spasm.        Thoracic back: Exhibits normal range of motion, no tenderness, no bony tenderness and no spasm.        Lumbar back:  Exhibits normal range of motion, no tenderness, no bony tenderness, no pain and no spasm.        Right foot: normal. There is normal range of motion, no tenderness, no bony tenderness, no crepitus and no laceration. There is no synovitis or tenderness of the MTP joints to palpation.        Left foot: normal. There is normal range of motion, no tenderness, no bony tenderness and no crepitus. There is no synovitis  or tenderness of the MTP joints to palpation.    Lymphadenopathy: No submental, no submandibular, and no occipital adenopathy present, has no cervical adenopathy or axillary lympadenopathy.    Neurological: Alert and oriented. No focal motor or sensory abnormalities. Strength is 5/5 Upper Extremities/Lower Extremities proximally and distally.    Skin: Skin is warm, dry and intact.  No rashes no purpuric petechial lesion    Psychiatric: Normal behavior.    Results:    Labs:      Lab Results   Component Value Date    WBC 4.3 10/13/2023    RBC 4.01 10/13/2023    HGB 13.8 10/13/2023    HCT 41.3 10/13/2023    .0 (H) 10/13/2023    MCH 34.4 (H) 10/13/2023    MCHC 33.4 10/13/2023    RDW 11.0 10/13/2023    .0 10/13/2023       No components found for: \"RELY\", \"NMET\", \"MYEL\", \"PROMY\", \"BARBRA\", \"ABSNEUTS\", \"ABSBANDS\", \"ABMM\", \"ABMY\", \"ABPM\", \"ABBL\"      Lab Results   Component Value Date     06/07/2023    K 4.6 06/07/2023    CO2 25.0 06/07/2023    BUN 11 06/07/2023    ALB 3.7 06/07/2023    AST 15 06/07/2023    ALT 17 06/07/2023          No components found for: \"ESRWESTERGRN\"       Lab Results   Component Value Date    CRP <0.29 04/05/2024         Lab Results   Component Value Date    CLARITY Hazy (A) 05/30/2023    UROBILINOGEN <2.0 05/30/2023     @LABRCNTIP(RF,B12)@      [unfilled]    Imaging:  No results found.    Assessment & Plan:      39-year-old woman comes in for further evaluation for pain fatigue    Fibromyalgia with multiple tender points nonrestorative sleep fatigue element of anxiety possible depression    Patient has no obvious synovitis on exam  Suspicion is low for connective tissue disease  Will complete autoimmune workup  Discussed with patient importance of addressing her anxiety stressors insomnia to help her overall pain levels  Continue follow-up with PCP consider psychiatry referral encouraged exercise such as tej chi yoga aquatic therapy yoga to be incorporated her daily regimen  I am  more than willing to get baseline x-rays of her cervical thoracic lumbar spine and pelvic x-rays but suspicion for significant arthritis is low encouraged exercise and stretching    She is on multiple psychotropic agents unfortunately I would not be able to add anything she will need close follow-up with PCP or psychiatrist to add on other additional treatment options including gabapentin or switching to Cymbalta considering agents such as Lyrica amitriptyline or nortriptyline could be other options through specialist or PCP    Strongly advise getting a sleep study through primary care doctor to assess insomnia nonrestorative sleep and fatigue    Unless her autoimmune workup completed is concerning we will see her back on appearing basis    Education and counseling provided to patient.  Instructed patient to call my office or seek medical attention immediately if symptoms worsen. Risks and side effects of medications and diagnosis discussed in detail and patient was given written information on new prescribed medications.    Return to clinic:  Return if symptoms worsen or fail to improve.    Liliam De Oliveira MD  8/19/2024

## 2024-08-19 NOTE — PATIENT INSTRUCTIONS
Daily reading is advised, 20 minutes per day.  \"12 Rules for Life: An Antidote to Chaos\" by Bernabe Mcclain  \"[facility] Guide to Stress-Free Living\" by Angel Garvin MD   \"Hardwiring Happiness: The New Brain Science of Contentment, Calm, and Confidence\" by Octaviano Galvan  \"Get Out of Your Mind and Into Your Life: The New Acceptance and Commitment Therapy\" by Zeus Mario   \"Full Catastrophe Living\" and \"Wherever You Go, There You Are\" by Hossein Perez   \"The Mindfulness Solution to Pain: Step-by-Step Techniques for Chronic Pain Management\", by Sarika Ricks   \"Is It Wright Dying For?: How To Make Stress Work For You - Not Against You\" by Dr. Clive Lino  \"Mind over Mood\" by Tamar.  \"[facility] on Chronic Pain\"  \"Radical Acceptance: Embracing Your Life With the Heart of a Buddha \" by Brigida Short    \"Man's Search for Meaning\", by Alden Perales  \"Left to Tell: Discovering God Amidst the [place] Holocaust\" by Liza Robison   \"Waking the Tiger : Healing Trauma : The Innate Capacity to Transform Overwhelming Experiences\", by Lewis Basurto     \"The Emperor's Handbook: A New Translation of The Meditations\" by Noe Alcantara   \"How to be a Stoic\" in [facility], by Rubina Kang (http://www.[employer].com/magazine/2016/12/19/bpi-tm-wz-a-stoic)  How to Be a Stoic by Vish Alcantara & Stoic Philosophy https://www.youtube.com/watch?v=d74tpx-XKKs  (http://opinionator.blogs.[employer]/2015/02/02/pgz-ol-ut-a-stoic/?emc=eta1)    \"How to Fail at Almost Everything and Still Win Big: Kind of the Story of My Life\" by Sushant Mendoza  \"Antifragile: Things That Gain from Disorder\" by Peter Hebert  \"Tools of Titans: The Tactics, Routines, and Habits of Billionaires, Icons, and World-Class Performers\", by Robert Patel   Paced breathing, such as in meditation, has been found to be helpful by some patients. Several mediation apps are available on Google Play and the  [employer]. She should perform this 10- 15 minutes twice daily for a couple of months and then assess its utility. Utilizing calming visualizations, such as nature scenes, can enhance the effect of the paced-breathing exercises (YouTube search: meditation and nature). Mind/body therapies, such as yoga, tej chi, exercise, and biofeedback, can also decrease the physical effects of stress.     These instructions by Dr. Angel Garvin offer breathing exercises that may be useful for training:  Calm and Energize: A Meditation With Your Breath (https://www.BugHerdube.com/watch?v=WOXUzN0ROp2)  Rhythm: A Meditation With a Word (https://www.BugHerdube.com/watch?v=FnlI-e4uxHw)  These instructions by Dr. Andrew Weil offer breathing exercises that may be useful for training: \"Three Breathing Exercises\" http://www.drweil.com/drw/u/REL92416/three-breathing-exercises.html.   Meditative movement therapies employ these breathing techniques and can help decrease the physical effects of stress including yoga, tej chi, exercise, and biofeedback.   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 The Association for Behavioral and Cognitive Therapies (ABCT) Find A Therapist Service: http://www.findcbt.org/xFAT/    Personality traits developed when younger are how one minoo with present life. A history of traumatic life events often form the basis for central  sensitization. As adults, such individuals often show tendencies for people-pleasing, perfectionism, and a strong sense of responsibility. Although this can make one an ideal worker, it comes at the cost of being physically exhausting and serves as a source of great frustration when symptoms prevent expected performance.     Often the manifestations of central sensitization coexist with mood disorders such as depression and anxiety. When these conditions are present, they can amply some of the debilitating symptoms and complicate recovery. Therefore, we strongly encourage our patients to work with their local physicians when mood symptoms are present. Psychiatry referrals can be facilitated by local physicians if necessary.     SLEEP  She has been diagnosed with possible narcolepsy, will meet with Sleep medicine to discuss diagnosis and treatment.    PHYSICAL AND OCCUPATIONAL THERAPY  Many patients will benefit from a brief period of targeted physical therapy for 8-12 weeks to improve musculoskeletal symptoms. Additionally, occupational therapy can be helpful for relaxation, stress management, life skills, and energy conservation. Referrals can be facilitated locally by the primary care provider.    EXERCISE  Above all else, she needs to begin cardio and weight training. An exercise program either independently or supervised by a  or physical therapist should focus on proximal lower extremity resistance strength training as well as aerobic fitness.   Some patients can benefit initially by using local [facility] for supervised slow incremental reconditioning.    Cardio should be done for at least 30 minutes every day.   The target heart rate should be at least 130 and perhaps close to 160 bpm, followed by a long cool down to allow heart rate recovery before stopping.   Recumbent exercises are often better tolerated, including recumbent biking, rowing, or swimming (e.g., walking or  jogging in water).   Examples of cardio exercise include brisk walking, jogging, elliptical machine, stair stepper, upright stationary bicycle, biking outdoors, and group fitness classes at a local gym such as [employer], cycling class, or [employer].     1) Postural training: It is important to remain upright as much as possible during the day. Patients with orthostatic intolerance can improve their tolerance of orthostatic stress by increasing each day the time spent upright. Prolonged bedrest is best avoided, as this can lead to orthostatic deconditioning.    2) Strength training: Exercises that strengthen the thigh muscles, such as cycling, can be helpful. Weight training should target the proximal lower limbs and core with such exercises as leg presses, toe presses, leg extensions, leg curls. Yoga and Pilates can also be helpful.  Daily large muscle exercises facilitate venous return.  These include bicep curls with 3-5 pound weights, squats, and heel raises working up to 25 repetitions of each exercise.   These can be done as part of the morning getting-out-of-bed routine, improving symptoms for the next several hours.     MEDICATIONS  There is no evidence that pure opioids, such as morphine or oxycodone have any benefit in fibromyalgia. In addition, opioid-induced hyperalgesia is a serious concern, in which pain is worsened by opioid use. Although Tramadol has moderate evidence for efficacy, adverse events, drug cross-reactions, and opioid-induced hyperalgesia argue against its consistent use.     Modest improvement of fibromyalgia has been observed with drugs targeting a diverse range of molecular mechanisms. However, no single drug has offered substantial efficacy. The heterogeneity of this disorder and widely varied responses to medications suggests existence of patient subgroups. As a result, therapies for fibromyalgia need to recognize the impact of central and peripheral aspects of the pathophysiology  utilizing both medications and nonpharmacologic approaches.    From a medication perspective, FDA-approved medications for fibromyalgia include duloxetine, milnacipran, and pregabalin. Additional options (non-FDA approved) include gabapentin and the older tricyclic agents (amitriptyline, nortriptyline). Ideally it is best to start with a single medication and slowly titrate upwards to a therapeutic dose, attempting to minimize adverse effects. A combined medication approach can also be undertaken. The advantage of this approach is the use of two different medications with different mechanisms at lower doses to minimize side effects with potential additive benefit from an overall symptom standpoint.    A reasonable approach to medication initiation and titration is listed below. The rate of titration should be based on medication tolerability, presence of adverse effects, and the patient's sensitivity towards medications. Following medication initiation, a patient must be reevaluated by their local provider for efficacy and side effects.     FDA approved:  Duloxetine (SNRI): Best for pain + mood symptoms.  · Start at 20 mg daily, increase over several weeks-to-months to 60 mg   · Maximum dose: 60 mg daily    Milnacipran (SNRI): Best for pain + mood symptoms.  · Start at 6.25-12.5 mg daily, increase over several weeks-to-months 25-50 mg   · Maximum dose: 100 mg twice daily    Pregabalin (Alpha-2-delta calcium channel ligand): Best for pain + sleep + paresthesia symptoms.   · Start at 25 mg daily, increase over several weeks-to-months to 50-75 mg   · Maximum dose: 225 mg twice daily    Non-FDA approved:  Gabapentin: Best for pain + sleep + paresthesia symptoms.  · Start at 100-300 mg nightly, increase over several weeks-to-months to 600 mg   · If efficacious, a morning or afternoon dose can be started with gradual uptitration  · Maximum dose: 2400 mg total per day    Amitriptyline or Nortriptyline: Best for pain +  sleep + paresthesia symptoms.  · Start at 10 mg nightly, and increase over several weeks-to-months to 50 mg  · Maximum dose: 75 mg daily    COMBINED MEDICATION APPROACH  A combination of low dose SNRI (Duloxetine 30 mg daily, or Milnacipran 12.5-25 mg daily) along with a low dose of pregabalin or gabapentin (pregabalin 50-75 mg or gabapentin 300 mg at night) could be considered. The advantage of this approach is use of two different medications with different mechanisms at lower doses to minimize side effects with potential additive benefit from an overall symptom standpoint.     DIET  Low inflammation diets can be helpful, such as the Mediterranean diet.  There is some recent evidence that probiotics alters the intestinal microbiome, which has considerable effects on the enteric nervous system connections with the central nervous system, including direct effects on intestinal function, mood and anxiety.  Probiotics, especially when coupled with prebiotic, can also be helpful.   Fermented milk products such as Activia yogurt or Ayaka's Kefir are useful examples. Notably, increased fiber, a prebiotic, can also be helpful at reducing inflammation, according to studies on osteoarthritis.    FATIGUE  Oncologists have been recommending ginseng for cancer patients to treat their chronic fatigue,   The mechanism for cancer-related fatigue appears to be the same as the proposed origin of chronic fatigue.  \"The mechanism by which American ginseng may be able to moderate fatigue is evidenced by preclinical data. Several investigators have established a consistent link between CRF and inflammation and have provided data to support dysregulation of the hypothalamic pituitary adrenal axis. These data suggest that chronic fatigue in cancer is associated with an inability for the hypothalamic pituitary adrenal axis to regulate inflammatory processes and that concentrations of inflammatory cytokines remain elevated instead of  reachieving homeostasis. Preclinical data evaluating the biologic activity of ginseng have demonstrated the ability of ginseng to downregulate inflammatory pathways, decrease inflammation, and modulate cortisol and the impact of chronic stress on the hypothalamic pituitary adrenal axis.\"  DOSE:  1,000 mg taken 2 times per day, at breakfast and lunch (before noon to ensure it doesn't negatively impact sleep although this has not been reported).  Vitamin C 1 g daily may help with reduction of tumor necrosis factor (TNF), an inflammatory cytokine known to be active in chronic fatigue symptoms.  Vitamin B6 is known to reduce inflammatory cytokines in inflammatory disorder such as rheumatoid arthritis. Recommended daily allowance (RDA) for oral intake is:  19 to 50 years: 1.3 mg per day.  >51 years: Females: 1.5 mg, Males: 1.7 mg per day.  Vitamin B6 deficiency can be treated with 100 mg daily for 4 weeks.  Excessive intake can cause neurologic affects such as neuropathy symptoms.

## 2024-08-22 ENCOUNTER — HOSPITAL ENCOUNTER (OUTPATIENT)
Dept: GENERAL RADIOLOGY | Age: 39
Discharge: HOME | End: 2024-08-22
Attending: INTERNAL MEDICINE
Payer: COMMERCIAL

## 2024-08-22 DIAGNOSIS — M79.7 FIBROMYALGIA: ICD-10-CM

## 2024-08-22 PROCEDURE — 72072 X-RAY EXAM THORAC SPINE 3VWS: CPT | Performed by: INTERNAL MEDICINE

## 2024-08-22 PROCEDURE — 72190 X-RAY EXAM OF PELVIS: CPT | Performed by: INTERNAL MEDICINE

## 2024-08-22 PROCEDURE — 72040 X-RAY EXAM NECK SPINE 2-3 VW: CPT | Performed by: INTERNAL MEDICINE

## 2024-08-22 PROCEDURE — 72110 X-RAY EXAM L-2 SPINE 4/>VWS: CPT | Performed by: INTERNAL MEDICINE

## 2024-08-23 ENCOUNTER — LAB ENCOUNTER (OUTPATIENT)
Dept: LAB | Age: 39
End: 2024-08-23
Attending: INTERNAL MEDICINE
Payer: COMMERCIAL

## 2024-08-23 DIAGNOSIS — E55.9 VITAMIN D DEFICIENCY: ICD-10-CM

## 2024-08-23 DIAGNOSIS — M79.7 FIBROMYALGIA: ICD-10-CM

## 2024-08-23 LAB
C3 SERPL-MCNC: 91.4 MG/DL (ref 84–160)
C4 SERPL-MCNC: 28.3 MG/DL (ref 12–36)
IGA SERPL-MCNC: 152.2 MG/DL (ref 40–350)
IGM SERPL-MCNC: 122 MG/DL (ref 50–300)
IMMUNOGLOBULIN PNL SER-MCNC: 1075 MG/DL (ref 650–1600)
URATE SERPL-MCNC: 4.2 MG/DL
VIT D+METAB SERPL-MCNC: 14.3 NG/ML (ref 30–100)

## 2024-08-23 PROCEDURE — 84165 PROTEIN E-PHORESIS SERUM: CPT

## 2024-08-23 PROCEDURE — 82306 VITAMIN D 25 HYDROXY: CPT

## 2024-08-23 PROCEDURE — 86235 NUCLEAR ANTIGEN ANTIBODY: CPT

## 2024-08-23 PROCEDURE — 84550 ASSAY OF BLOOD/URIC ACID: CPT

## 2024-08-23 PROCEDURE — 83521 IG LIGHT CHAINS FREE EACH: CPT

## 2024-08-23 PROCEDURE — 86800 THYROGLOBULIN ANTIBODY: CPT

## 2024-08-23 PROCEDURE — 82784 ASSAY IGA/IGD/IGG/IGM EACH: CPT

## 2024-08-23 PROCEDURE — 86160 COMPLEMENT ANTIGEN: CPT

## 2024-08-23 PROCEDURE — 86334 IMMUNOFIX E-PHORESIS SERUM: CPT

## 2024-08-23 PROCEDURE — 86376 MICROSOMAL ANTIBODY EACH: CPT

## 2024-08-23 PROCEDURE — 87522 HEPATITIS C REVRS TRNSCRPJ: CPT

## 2024-08-23 RX ORDER — ERGOCALCIFEROL 1.25 MG/1
50000 CAPSULE, LIQUID FILLED ORAL WEEKLY
Qty: 12 CAPSULE | Refills: 0 | Status: SHIPPED | OUTPATIENT
Start: 2024-08-23 | End: 2024-11-21

## 2024-08-24 LAB
THYROGLOB SERPL-MCNC: <15 U/ML (ref ?–60)
THYROPEROXIDASE AB SERPL-ACNC: 41 U/ML (ref ?–60)

## 2024-08-26 LAB
ENA RNP IGG SER IA-ACNC: 0.6 U/ML
ENA SM IGG SER IA-ACNC: <0.7 U/ML
ENA SS-A IGG SER IA-ACNC: <0.4 U/ML
ENA SS-B IGG SER IA-ACNC: <0.4 U/ML
KAPPA LC FREE SER-MCNC: 1.39 MG/DL (ref 0.33–1.94)
KAPPA LC FREE/LAMBDA FREE SER NEPH: 1.47 {RATIO} (ref 0.26–1.65)
LAMBDA LC FREE SERPL-MCNC: 0.95 MG/DL (ref 0.57–2.63)
U1 SNRNP IGG SER IA-ACNC: 0.7 U/ML

## 2024-08-27 ENCOUNTER — TELEPHONE (OUTPATIENT)
Dept: RHEUMATOLOGY | Facility: CLINIC | Age: 39
End: 2024-08-27

## 2024-08-27 DIAGNOSIS — M62.830 LUMBAR PARASPINAL MUSCLE SPASM: Primary | ICD-10-CM

## 2024-08-27 DIAGNOSIS — M62.838 CERVICAL PARASPINAL MUSCLE SPASM: ICD-10-CM

## 2024-08-27 DIAGNOSIS — M15.0 PRIMARY OSTEOARTHRITIS INVOLVING MULTIPLE JOINTS: ICD-10-CM

## 2024-08-27 LAB
ALBUMIN SERPL ELPH-MCNC: 4.46 G/DL (ref 3.75–5.21)
ALBUMIN/GLOB SERPL: 1.75 {RATIO} (ref 1–2)
ALPHA1 GLOB SERPL ELPH-MCNC: 0.26 G/DL (ref 0.19–0.46)
ALPHA2 GLOB SERPL ELPH-MCNC: 0.6 G/DL (ref 0.48–1.05)
B-GLOBULIN SERPL ELPH-MCNC: 0.64 G/DL (ref 0.68–1.23)
GAMMA GLOB SERPL ELPH-MCNC: 1.04 G/DL (ref 0.62–1.7)
PROT SERPL-MCNC: 7 G/DL (ref 5.7–8.2)

## 2024-08-27 NOTE — TELEPHONE ENCOUNTER
FINDINGS:  Punctate probable left renal stone.  There is lumbar lordosis.  There are mild endplate degenerative changes at the lumbar spine.  There is mild facet arthropathy at L4-5 and L5-S1.  No acute displaced osseous fracture is seen.  There is   lumbar lordosis.     X-rays of the lumbar spine shows mild osteoarthritis    Cervical spine x-rays show muscle spasm and minimal arthritis    Thoracic spine x-rays also show mild arthritis    Pelvic x-ray is normal    As discussed in clinic would suggest exercise strengthening consider physical therapy or pain management referral patient would like to go further steps they will decide whether MRI and other intervention is needed

## 2024-08-28 NOTE — TELEPHONE ENCOUNTER
Phoned pt, notified of test results per Dr. De Oliveira     \"X-rays of the lumbar spine shows mild osteoarthritis     Cervical spine x-rays show muscle spasm and minimal arthritis     Thoracic spine x-rays also show mild arthritis     Pelvic x-ray is normal     As discussed in clinic would suggest exercise strengthening consider physical therapy or pain management referral patient would like to go further steps they will decide whether MRI and other intervention is needed\"    Pt voices understanding, and would like referral placed for PT Edward.

## 2024-09-22 ENCOUNTER — HOSPITAL ENCOUNTER (OUTPATIENT)
Age: 39
Discharge: HOME | End: 2024-09-22
Payer: COMMERCIAL

## 2024-09-22 VITALS
HEART RATE: 77 BPM | RESPIRATION RATE: 20 BRPM | HEIGHT: 62.5 IN | TEMPERATURE: 98 F | BODY MASS INDEX: 22.61 KG/M2 | SYSTOLIC BLOOD PRESSURE: 128 MMHG | OXYGEN SATURATION: 99 % | DIASTOLIC BLOOD PRESSURE: 70 MMHG | WEIGHT: 126 LBS

## 2024-09-22 DIAGNOSIS — H11.31 SUBCONJUNCTIVAL HEMORRHAGE OF RIGHT EYE: Primary | ICD-10-CM

## 2024-09-22 PROBLEM — M79.81 ACHENBACH'S SYNDROME: Status: ACTIVE | Noted: 2024-03-24

## 2024-09-22 PROBLEM — F90.9 ATTENTION DEFICIT HYPERACTIVITY DISORDER (ADHD): Status: ACTIVE | Noted: 2017-07-25

## 2024-09-22 PROCEDURE — 99202 OFFICE O/P NEW SF 15 MIN: CPT

## 2024-09-22 PROCEDURE — 99212 OFFICE O/P EST SF 10 MIN: CPT

## 2024-09-22 NOTE — ED INITIAL ASSESSMENT (HPI)
Patient presents to IC with c/o right eye redness since Thursday. Denies drainage.  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

## 2024-09-22 NOTE — DISCHARGE INSTRUCTIONS
See attached information     - Avoid touching / rubbing eye area   - You may benefit from lubricating drops (e.g. Refresh)   - Drink plenty of water   - Follow up with primary care provider as needed

## 2024-09-22 NOTE — ED PROVIDER NOTES
History     Chief Complaint   Patient presents with    Eye Visual Problem       Subjective:   HPI    Kaelynrona Ayon, 39 year old female with notable medical history of n/a who presents with reddened eye. Patient reports noticing redness to her medial Right eye 3-days ago with subjective mild improvement yesterday, but today appears worse. Denies pain, discharge, FB sensation, URI symptoms. PATIENT does look at a screen a lot. Patient does reports recent eye exam.        Objective:   Past Medical History:    Calculus of kidney    Diverticulosis              Past Surgical History:   Procedure Laterality Date    Appendectomy      Colonoscopy      july 2021    Other surgical history      sinus surgery     Other surgical history      oral surgery     Tonsillectomy                  Social History     Socioeconomic History    Marital status:    Tobacco Use    Smoking status: Never    Smokeless tobacco: Never   Vaping Use    Vaping status: Never Used   Substance and Sexual Activity    Alcohol use: Yes    Drug use: No     Social Determinants of Health      [facility]              No current facility-administered medications on file prior to encounter.     Current Outpatient Medications on File Prior to Encounter   Medication Sig Dispense Refill    ergocalciferol 1.25 MG (14341 UT) Oral Cap Take 1 capsule (50,000 Units total) by mouth once a week. 12 capsule 0    dicyclomine 20 MG Oral Tab Take 1 tablet (20 mg total) by mouth.      levonorgestrel (KYLEENA) 19.5 MG Intrauterine IUD 19,500 mcg (1 each total) by Intrauterine route one time.      busPIRone HCl 10 MG Oral Tab Take 1 tablet (10 mg total) by mouth in the morning and 1 tablet (10 mg total) before bedtime.      Atomoxetine HCl 60 MG Oral Cap TK 1 C PO EVERY MORNING           Review of Systems   Eyes:  Positive for redness. Negative for pain and discharge.   All other systems reviewed and are  negative.        Constitutional and vital signs reviewed.      All other systems reviewed and negative except as noted above.    I have reviewed the family history, social history, allergies, and outpatient medications.     History reviewed from EMR: Encounters, problem list, allergies, medications      Physical Exam     ED Triage Vitals [09/22/24 1220]   /70   Pulse 77   Resp 20   Temp 97.9 °F (36.6 °C)   Temp src Temporal   SpO2 99 %   O2 Device None (Room air)       Current:/70   Pulse 77   Temp 97.9 °F (36.6 °C) (Temporal)   Resp 20   Ht 158.8 cm (5' 2.5\")   Wt 57.2 kg   LMP  (LMP Unknown)   SpO2 99%   BMI 22.68 kg/m²       Physical Exam  Vitals and nursing note reviewed.   Constitutional:       General: She is not in acute distress.     Appearance: Normal appearance. She is normal weight. She is not ill-appearing or toxic-appearing.   HENT:      Head: Normocephalic and atraumatic.      Right Ear: External ear normal.      Left Ear: External ear normal.      Nose: Nose normal.      Mouth/Throat:      Mouth: Mucous membranes are moist.   Eyes:      Extraocular Movements: Extraocular movements intact.      Conjunctiva/sclera:      Right eye: Hemorrhage present.      Pupils: Pupils are equal, round, and reactive to light.        Comments: +hemorrhage to Right medial conjunctiva.   Cardiovascular:      Rate and Rhythm: Normal rate.      Pulses: Normal pulses.   Pulmonary:      Effort: Pulmonary effort is normal. No respiratory distress.   Musculoskeletal:         General: No swelling, tenderness or signs of injury. Normal range of motion.      Cervical back: Normal range of motion and neck supple.   Skin:     General: Skin is warm and dry.      Capillary Refill: Capillary refill takes less than 2 seconds.      Coloration: Skin is not jaundiced.   Neurological:      General: No focal deficit present.      Mental Status: She is alert and oriented to person, place, and time. Mental status is at  baseline.   Psychiatric:         Mood and Affect: Mood normal.         Behavior: Behavior normal.         Thought Content: Thought content normal.         Judgment: Judgment normal.            ED Course     Labs Reviewed - No data to display  No orders to display       Vitals:    09/22/24 1220   BP: 128/70   Pulse: 77   Resp: 20   Temp: 97.9 °F (36.6 °C)   TempSrc: Temporal   SpO2: 99%   Weight: 57.2 kg   Height: 158.8 cm (5' 2.5\")            [facility]        Kaelyn Ayon, 39 year old female with medical history as noted above who presents with reddened eye   - Patient in NAD, VSS   - conjunctivitis vs subconjunctival hemorrhage vs allergies vs other   - Exam c/w subconjunctival hemorrhage   - Reassurance provided   - f/u with primary care provider as needed        ** See ED course below for additional information on care provided / interventions / notable events throughout patient's encounter.         ** I have independently reviewed the radiology images, clinical lab results, and ECG tracings as described above (if applicable)    ** Concerning co-morbidities possibly affecting complaint / care: n/a    ** See below for home care instructions (if applicable)          Medical Decision Making  Risk  OTC drugs.        Disposition and Plan     Clinical Impression:  1. Subconjunctival hemorrhage of right eye         Disposition:  Discharge  9/22/2024 12:32 pm    Follow-up:  Estevan Weinstein DO  [address]  [address] [address]  [phone]      As needed          Medications Prescribed:  Current Discharge Medication List          The above patient (and/or guardian) was made aware that an appropriate evaluation has been performed, and that no additional testing is required at this time. In my medical judgment, there is currently no evidence of an immediate life-threatening or surgical condition, therefore discharge is indicated at this time. The patient (and/or guardian) was advised that a small risk still  exists that a serious condition could develop. The patient was instructed to arrange close follow-up with their primary care provider (or the referral provider given today). The patient received written and verbal instructions regarding their condition / concerns, demonstrated understanding, and is agreement with the outpatient treatment plan.        Home care instructions:    See attached information     - Avoid touching / rubbing eye area   - You may benefit from lubricating drops (e.g. Refresh)   - Drink plenty of water   - Follow up with primary care provider as needed      Beto Zhang, DNP, APRN, AGACNP-BC, FNP-C, CNL  Adult-Gerontology Acute Care & Family Nurse Practitioner  [facility]

## 2025-01-19 ENCOUNTER — OFFICE VISIT (OUTPATIENT)
Dept: FAMILY MEDICINE CLINIC | Facility: CLINIC | Age: 40
End: 2025-01-19
Payer: COMMERCIAL

## 2025-01-19 VITALS
OXYGEN SATURATION: 98 % | TEMPERATURE: 99 F | DIASTOLIC BLOOD PRESSURE: 60 MMHG | BODY MASS INDEX: 23 KG/M2 | HEART RATE: 95 BPM | RESPIRATION RATE: 16 BRPM | WEIGHT: 125 LBS | SYSTOLIC BLOOD PRESSURE: 110 MMHG

## 2025-01-19 DIAGNOSIS — H69.93 DYSFUNCTION OF BOTH EUSTACHIAN TUBES: ICD-10-CM

## 2025-01-19 DIAGNOSIS — R09.82 POST-NASAL DRAINAGE: Primary | ICD-10-CM

## 2025-01-19 DIAGNOSIS — J06.9 UPPER RESPIRATORY TRACT INFECTION, UNSPECIFIED TYPE: ICD-10-CM

## 2025-01-19 NOTE — PROGRESS NOTES
Subjective:   Patient ID: Kaelyn Ayon is a 40 year old female.    Patient presents to clinic with complaints of sore throat, ear fullness with itching, nasal congestion and laryngitis. Has been taking dayquil and nyquil. Reports that she has been sick on and off since Christmas. Denies fever.    Sore Throat   This is a new problem. The current episode started in the past 7 days. The problem has been unchanged. Neither side of throat is experiencing more pain than the other. There has been no fever. Associated symptoms include congestion and ear pain. Pertinent negatives include no shortness of breath, swollen glands or trouble swallowing. She has had no exposure to strep.       History/Other:   Review of Systems   Constitutional:  Positive for fatigue. Negative for fever.   HENT:  Positive for congestion, ear pain and sore throat. Negative for trouble swallowing.    Respiratory:  Negative for shortness of breath.    All other systems reviewed and are negative.    Current Outpatient Medications   Medication Sig Dispense Refill    dicyclomine 20 MG Oral Tab Take 1 tablet (20 mg total) by mouth.      levonorgestrel (KYLEENA) 19.5 MG Intrauterine IUD 19,500 mcg (1 each total) by Intrauterine route one time.      busPIRone HCl 10 MG Oral Tab Take 1 tablet (10 mg total) by mouth in the morning and 1 tablet (10 mg total) before bedtime.      Atomoxetine HCl 60 MG Oral Cap TK 1 C PO EVERY MORNING       Allergies:Allergies[1]    Objective:   Physical Exam  Vitals reviewed.   Constitutional:       General: She is not in acute distress.     Appearance: Normal appearance. She is not ill-appearing or toxic-appearing.   HENT:      Head: Normocephalic and atraumatic.      Right Ear: Ear canal and external ear normal. A middle ear effusion is present. Tympanic membrane is not erythematous, retracted or bulging.      Left Ear: Ear canal and external ear normal. A middle ear effusion is present. Tympanic membrane is not  erythematous, retracted or bulging.      Ears:      Comments: Clear fluid      Nose: Nose normal.      Mouth/Throat:      Mouth: Mucous membranes are moist.      Pharynx: Oropharynx is clear. Uvula midline. Postnasal drip present. No oropharyngeal exudate, posterior oropharyngeal erythema or uvula swelling.      Tonsils: No tonsillar exudate or tonsillar abscesses.   Cardiovascular:      Rate and Rhythm: Normal rate and regular rhythm.      Pulses: Normal pulses.      Heart sounds: Normal heart sounds.   Pulmonary:      Effort: Pulmonary effort is normal. No respiratory distress.      Breath sounds: Normal breath sounds. No decreased breath sounds, wheezing, rhonchi or rales.   Musculoskeletal:         General: Normal range of motion.      Cervical back: Normal range of motion and neck supple.   Lymphadenopathy:      Cervical: No cervical adenopathy.   Skin:     General: Skin is warm and dry.      Capillary Refill: Capillary refill takes less than 2 seconds.   Neurological:      General: No focal deficit present.      Mental Status: She is alert and oriented to person, place, and time.   Psychiatric:         Mood and Affect: Mood normal.         Behavior: Behavior normal.         Assessment & Plan:   1. Post-nasal drainage    2. Dysfunction of both eustachian tubes    3. Upper respiratory tract infection, unspecified type        Discussion about probable viral cause of symptoms and supportive treatment. Discussion about over the counter medications, hydration, humidifier, salt water gargles and rest. Discussion  to use flonase 2 spray at bedtime.       Meds This Visit:  Requested Prescriptions      No prescriptions requested or ordered in this encounter       Imaging & Referrals:  None         [1]   Allergies  Allergen Reactions    Amoxicillin NAUSEA AND VOMITING    Metronidazole HALLUCINATION and ITCHING

## 2025-08-19 ENCOUNTER — TELEPHONE (OUTPATIENT)
Dept: INTERNAL MEDICINE CLINIC | Facility: CLINIC | Age: 40
End: 2025-08-19

## 2025-08-19 DIAGNOSIS — Z13.220 SCREENING FOR LIPID DISORDERS: ICD-10-CM

## 2025-08-19 DIAGNOSIS — Z13.0 SCREENING FOR DISORDER OF BLOOD AND BLOOD-FORMING ORGANS: ICD-10-CM

## 2025-08-19 DIAGNOSIS — Z13.29 SCREENING FOR THYROID DISORDER: ICD-10-CM

## 2025-08-19 DIAGNOSIS — Z13.228 SCREENING FOR ENDOCRINE, METABOLIC AND IMMUNITY DISORDER: ICD-10-CM

## 2025-08-19 DIAGNOSIS — Z13.0 SCREENING FOR ENDOCRINE, METABOLIC AND IMMUNITY DISORDER: ICD-10-CM

## 2025-08-19 DIAGNOSIS — Z00.00 ROUTINE GENERAL MEDICAL EXAMINATION AT A HEALTH CARE FACILITY: Primary | ICD-10-CM

## 2025-08-19 DIAGNOSIS — Z13.29 SCREENING FOR ENDOCRINE, METABOLIC AND IMMUNITY DISORDER: ICD-10-CM
